# Patient Record
Sex: MALE | Race: OTHER | NOT HISPANIC OR LATINO | Employment: UNEMPLOYED | URBAN - METROPOLITAN AREA
[De-identification: names, ages, dates, MRNs, and addresses within clinical notes are randomized per-mention and may not be internally consistent; named-entity substitution may affect disease eponyms.]

---

## 2023-10-31 ENCOUNTER — APPOINTMENT (OUTPATIENT)
Dept: RADIOLOGY | Facility: CLINIC | Age: 43
End: 2023-10-31
Payer: MEDICAID

## 2025-03-11 ENCOUNTER — APPOINTMENT (OUTPATIENT)
Dept: NEUROLOGY | Facility: CLINIC | Age: 45
End: 2025-03-11
Payer: MEDICAID

## 2025-03-18 ENCOUNTER — OFFICE VISIT (OUTPATIENT)
Dept: NEUROLOGY | Facility: CLINIC | Age: 45
End: 2025-03-18
Payer: MEDICAID

## 2025-03-18 VITALS
SYSTOLIC BLOOD PRESSURE: 128 MMHG | HEIGHT: 66 IN | BODY MASS INDEX: 31.02 KG/M2 | HEART RATE: 70 BPM | DIASTOLIC BLOOD PRESSURE: 72 MMHG | WEIGHT: 193 LBS

## 2025-03-18 DIAGNOSIS — M54.16 LUMBAR RADICULOPATHY: ICD-10-CM

## 2025-03-18 DIAGNOSIS — N52.9 ERECTILE DYSFUNCTION, UNSPECIFIED ERECTILE DYSFUNCTION TYPE: Primary | ICD-10-CM

## 2025-03-18 PROCEDURE — 99215 OFFICE O/P EST HI 40 MIN: CPT | Performed by: SPECIALIST

## 2025-03-18 PROCEDURE — 99417 PROLNG OP E/M EACH 15 MIN: CPT | Performed by: SPECIALIST

## 2025-03-18 PROCEDURE — 3008F BODY MASS INDEX DOCD: CPT | Performed by: SPECIALIST

## 2025-03-18 ASSESSMENT — PATIENT HEALTH QUESTIONNAIRE - PHQ9
SUM OF ALL RESPONSES TO PHQ9 QUESTIONS 1 AND 2: 0
1. LITTLE INTEREST OR PLEASURE IN DOING THINGS: NOT AT ALL
2. FEELING DOWN, DEPRESSED OR HOPELESS: NOT AT ALL

## 2025-03-18 ASSESSMENT — ANXIETY QUESTIONNAIRES
5. BEING SO RESTLESS THAT IT IS HARD TO SIT STILL: NOT AT ALL
IF YOU CHECKED OFF ANY PROBLEMS ON THIS QUESTIONNAIRE, HOW DIFFICULT HAVE THESE PROBLEMS MADE IT FOR YOU TO DO YOUR WORK, TAKE CARE OF THINGS AT HOME, OR GET ALONG WITH OTHER PEOPLE: NOT DIFFICULT AT ALL
1. FEELING NERVOUS, ANXIOUS, OR ON EDGE: NOT AT ALL
7. FEELING AFRAID AS IF SOMETHING AWFUL MIGHT HAPPEN: NOT AT ALL
4. TROUBLE RELAXING: NOT AT ALL
3. WORRYING TOO MUCH ABOUT DIFFERENT THINGS: NOT AT ALL
6. BECOMING EASILY ANNOYED OR IRRITABLE: NOT AT ALL
2. NOT BEING ABLE TO STOP OR CONTROL WORRYING: NOT AT ALL
GAD7 TOTAL SCORE: 0

## 2025-03-18 ASSESSMENT — ENCOUNTER SYMPTOMS
DEPRESSION: 0
OCCASIONAL FEELINGS OF UNSTEADINESS: 0
LOSS OF SENSATION IN FEET: 0

## 2025-03-18 NOTE — PROGRESS NOTES
Corbin Chowdhury, MRN: [id number], : 1980  Reason for Visit: Erectile dysfunction       Primary Care Physician: No primary care provider on file.            History of Present Illness:    Mr. Chowdhury is a 44 y.o. right handed male with a history of degenerative disc disease (L5-S1), bilateral sacroiliac joint osteoarthritis, recurrent UTIs, arthralgia, obesity  and vitamin D deficiency who presents with intermittent bilateral lower extremity numbness, tingling, and pain over the past few years, with worsening symptoms in the last 4-5 months, aggravated by prolonged sitting, standing, and heavy lifting.who has been complaining of chronic erectile dysfunction for years with recent worsening.  He describes the events to happen after hemorrhoid surgeries first long time ago and the second 1 was 5 years ago.    He describes as well tingling numbness sensation, pain and burning sensation both legs will sitting down on the floor for prolonged time.  He denies h/o low back trauma, urine or bowel incontinence weakness bilateral lower extremities, imbalance or frequent falls    He was evaluated by myself in  and was diagnosed with chronic bilateral lumbosacral radiculopathy due to the history of disc disease of the lumbar spine with superimposed obesity and was recommended to have:    - Lumbar spine X-ray with flexion and extension views (Completed 2023, unremarkable findings).  - Repeat EMG/NCS, and Physical therapy ( He didn't do).    In addition he was recommended  Weight management, targeting a BMI below 25.  Activity modification, avoiding prolonged lifting, standing, or sitting.    He did not follow-up with me after that last visit in .     Prior Work up:   - MRI (2022, [place]): L5-S1 degenerative disc disease with left-sided nerve root effacement and loss of lumbar lordosis.  - EMG/NCS (2022, [place]): L5-S1 radiculopathy, no peripheral neuropathy.    Treatment History:  Meloxicam  "provided mild symptom relief.  Vitamin D and B12 supplements initiated.  Follow up  by Dr. Dwayne Lezama (Rheumatologist) for further evaluation.    Family history of degenerative disc disease at age 45.  Previously engaged in weightlifting but stopped in recent years.    ROS:   Reviewing his chart revealed that he has chronic prostatitis, hypogonadism in male, dysuria, anterior urethral stricture,  erectile dysfunction unspecified type, palpitation, dorsalgia exertional dyspnea, sinus arrhythmia,    He denies imbalance, bowel incontinence, or recent lower back trauma.         Physical Exam:   /72   Pulse 70   Ht 1.676 m (5' 6\")   Wt 87.5 kg (193 lb)   BMI 31.15 kg/m²       Constitutional: General appearance: in no active distress, pleasant, interactive, intact speech content   Carotid Arteries: Intact without any bruits   Peripheral Vascular Exam: Pulses +2 and equal in all extremities. No swelling, varicosities, edema or tenderness to palpations   Mental status: The patient was in no distress, alert, interactive and cooperative. Affect is appropriate.   Cranial nerve II: Visual fields full to confrontation.   Cranial nerves III, IV, and VI: Pupils round, equally reactive to light; no ptosis. EOMs intact. No nystagmus.   Cranial Nerve V: Facial sensation intact bilaterally.   Cranial nerve VII: Normal and symmetric facial strength.   Cranial nerve VIII: Hearing is intact bilaterally to finger rub / whisper.   Cranial nerves IX and X: Palate elevates symmetrically.   Cranial nerve XI: Shoulder shrug and neck rotation strength are intact.   Cranial nerve XII: Tongue midline with normal strength.     Motor: He has normal muscle strength bulk and tone.  Negative bilateral straight leg raising maneuver  Plantar Reflex: Toes downgoing to plantar stimulation. DTR +2 symmetrical throughout.   Sensory Exam:. Normal to all modalities ( vibration, proprioception, and pinprick sensation) no truncal sensory loss no " saddle anesthesia  Coordination: There is no limb ataxia and rapid alternating movements are intact.   Gait : normal without spasticity, ataxia or bradykinesia. Stance is stable with a negative Romberg.        Assessment and plan:  44-year-old male with a history of L5-S1 degenerative disc disease, bilateral SI joint osteoarthritis, obesity, chronic prostatitis, hypogonadism, and erectile dysfunction, presenting with chronic intermittent bilateral lower extremity numbness, tingling, burning pain, and worsening erectile dysfunction. Symptoms are aggravated by prolonged sitting and standing and reportedly worsened after two hemorrhoid surgeries, in addition the patient has a history of chronic prostatitis, hypogonadism, dysuria, anterior urethral stricture.  His physical exam and history did not suggest cauda equina    Previously diagnosed with chronic bilateral lumbosacral radiculopathy has a relatively improved . He but did not complete recommended workup (EMG/NCS, PT, weight management).I recommend:     MRI lumbar  and sacral spine ( The patient is requesting an OPEN MRI, and has a shrapnel     EMG/nerve conduction study of bilateral lower extremities should symptoms worsen.    Physical therapy evaluation and treatment    X-ray of the lumbar spine with extension and flexion views    Weight loss with BMI less than 25    Avoid prolonged lifting standing or sitting activities.    Follow-up with Urology/neuro urology regarding his erectile dysfunction with the consideration of further evaluation of possible pudendal nerve injury    The patient to be followed up in my office in 3 months or sooner as needed       Omar Monroy M.D.

## 2025-04-06 ENCOUNTER — APPOINTMENT (OUTPATIENT)
Dept: RADIOLOGY | Facility: HOSPITAL | Age: 45
End: 2025-04-06
Payer: MEDICAID

## 2025-04-06 ENCOUNTER — HOSPITAL ENCOUNTER (OUTPATIENT)
Dept: RADIOLOGY | Facility: HOSPITAL | Age: 45
Discharge: HOME | End: 2025-04-06
Payer: MEDICAID

## 2025-04-06 DIAGNOSIS — N52.9 ERECTILE DYSFUNCTION, UNSPECIFIED ERECTILE DYSFUNCTION TYPE: ICD-10-CM

## 2025-04-06 DIAGNOSIS — M54.16 LUMBAR RADICULOPATHY: ICD-10-CM

## 2025-06-09 ENCOUNTER — HOSPITAL ENCOUNTER (OUTPATIENT)
Dept: RADIOLOGY | Facility: CLINIC | Age: 45
Discharge: HOME | End: 2025-06-09
Payer: MEDICAID

## 2025-06-09 ENCOUNTER — HOSPITAL ENCOUNTER (OUTPATIENT)
Dept: RADIOLOGY | Facility: CLINIC | Age: 45
End: 2025-06-09
Payer: MEDICAID

## 2025-06-09 DIAGNOSIS — M54.16 LUMBAR RADICULOPATHY: ICD-10-CM

## 2025-06-09 PROCEDURE — 72110 X-RAY EXAM L-2 SPINE 4/>VWS: CPT | Performed by: RADIOLOGY

## 2025-06-09 PROCEDURE — 72120 X-RAY BEND ONLY L-S SPINE: CPT

## 2025-07-25 ENCOUNTER — HOSPITAL ENCOUNTER (OUTPATIENT)
Dept: RADIOLOGY | Facility: CLINIC | Age: 45
Discharge: HOME | End: 2025-07-25
Payer: MEDICAID

## 2025-07-25 PROCEDURE — 72195 MRI PELVIS W/O DYE: CPT

## 2025-07-25 PROCEDURE — 72148 MRI LUMBAR SPINE W/O DYE: CPT

## 2025-08-11 ENCOUNTER — OFFICE VISIT (OUTPATIENT)
Dept: NEUROLOGY | Facility: CLINIC | Age: 45
End: 2025-08-11
Payer: MEDICAID

## 2025-08-11 VITALS
HEIGHT: 66 IN | DIASTOLIC BLOOD PRESSURE: 79 MMHG | SYSTOLIC BLOOD PRESSURE: 119 MMHG | BODY MASS INDEX: 30.53 KG/M2 | WEIGHT: 190 LBS | HEART RATE: 71 BPM

## 2025-08-11 DIAGNOSIS — M54.16 LUMBAR RADICULOPATHY: Primary | ICD-10-CM

## 2025-08-11 PROCEDURE — 99212 OFFICE O/P EST SF 10 MIN: CPT | Performed by: SPECIALIST

## 2025-08-11 PROCEDURE — 1036F TOBACCO NON-USER: CPT | Performed by: SPECIALIST

## 2025-08-11 PROCEDURE — 3008F BODY MASS INDEX DOCD: CPT | Performed by: SPECIALIST

## 2025-08-11 PROCEDURE — 99215 OFFICE O/P EST HI 40 MIN: CPT | Performed by: SPECIALIST

## 2025-08-11 RX ORDER — DULOXETIN HYDROCHLORIDE 30 MG/1
30 CAPSULE, DELAYED RELEASE ORAL DAILY
Qty: 60 CAPSULE | Refills: 11 | Status: SHIPPED | OUTPATIENT
Start: 2025-08-11

## 2025-08-11 ASSESSMENT — PATIENT HEALTH QUESTIONNAIRE - PHQ9
1. LITTLE INTEREST OR PLEASURE IN DOING THINGS: NOT AT ALL
2. FEELING DOWN, DEPRESSED OR HOPELESS: NOT AT ALL
SUM OF ALL RESPONSES TO PHQ9 QUESTIONS 1 & 2: 0

## 2025-08-11 ASSESSMENT — ENCOUNTER SYMPTOMS
DEPRESSION: 0
OCCASIONAL FEELINGS OF UNSTEADINESS: 0
LOSS OF SENSATION IN FEET: 0